# Patient Record
Sex: FEMALE | Race: WHITE | NOT HISPANIC OR LATINO | Employment: FULL TIME | URBAN - METROPOLITAN AREA
[De-identification: names, ages, dates, MRNs, and addresses within clinical notes are randomized per-mention and may not be internally consistent; named-entity substitution may affect disease eponyms.]

---

## 2018-05-24 ENCOUNTER — HISTORICAL (OUTPATIENT)
Dept: ADMINISTRATIVE | Facility: HOSPITAL | Age: 28
End: 2018-05-24

## 2018-05-24 LAB
ABS NEUT (OLG): 3.4
ALBUMIN SERPL-MCNC: 4.5 GM/DL (ref 3.4–5)
ALBUMIN/GLOB SERPL: 1.67 {RATIO} (ref 1.5–2.5)
ALP SERPL-CCNC: 68 UNIT/L (ref 38–126)
ALT SERPL-CCNC: 35 UNIT/L (ref 7–52)
APPEARANCE, UA: CLEAR
AST SERPL-CCNC: 28 UNIT/L (ref 15–37)
BACTERIA #/AREA URNS AUTO: ABNORMAL /HPF
BILIRUB SERPL-MCNC: 0.6 MG/DL (ref 0.2–1)
BILIRUB UR QL STRIP: NEGATIVE MG/DL
BILIRUBIN DIRECT+TOT PNL SERPL-MCNC: 0.2 MG/DL (ref 0–0.5)
BILIRUBIN DIRECT+TOT PNL SERPL-MCNC: 0.4 MG/DL
BUN SERPL-MCNC: 10 MG/DL (ref 7–18)
CALCIUM SERPL-MCNC: 8.6 MG/DL (ref 8.5–10)
CHLORIDE SERPL-SCNC: 104 MMOL/L (ref 98–107)
CHOLEST SERPL-MCNC: 181 MG/DL (ref 0–200)
CHOLEST/HDLC SERPL: 3.4 {RATIO}
CO2 SERPL-SCNC: 27 MMOL/L (ref 21–32)
COLOR UR: YELLOW
CREAT SERPL-MCNC: 0.65 MG/DL (ref 0.6–1.3)
ERYTHROCYTE [DISTWIDTH] IN BLOOD BY AUTOMATED COUNT: 12.5 % (ref 11.5–17)
GLOBULIN SER-MCNC: 2.7 GM/DL (ref 1.2–3)
GLUCOSE (UA): NEGATIVE MG/DL
GLUCOSE SERPL-MCNC: 96 MG/DL (ref 74–106)
HCT VFR BLD AUTO: 38.8 % (ref 37–47)
HDLC SERPL-MCNC: 54 MG/DL (ref 35–60)
HGB BLD-MCNC: 13.1 GM/DL (ref 12–16)
HGB UR QL STRIP: ABNORMAL UNIT/L
KETONES UR QL STRIP: NEGATIVE MG/DL
LDLC SERPL CALC-MCNC: 105 MG/DL (ref 0–129)
LEUKOCYTE ESTERASE UR QL STRIP: NEGATIVE UNIT/L
LYMPHOCYTES # BLD AUTO: 2.2 X10(3)/MCL (ref 0.6–3.4)
LYMPHOCYTES NFR BLD AUTO: 37.3 % (ref 13–40)
MCH RBC QN AUTO: 29.6 PG (ref 27–31.2)
MCHC RBC AUTO-ENTMCNC: 34 GM/DL (ref 32–36)
MCV RBC AUTO: 88 FL (ref 80–94)
MONOCYTES # BLD AUTO: 0.4 X10(3)/MCL (ref 0–1.8)
MONOCYTES NFR BLD AUTO: 7.4 % (ref 0.1–24)
NEUTROPHILS NFR BLD AUTO: 55.3 % (ref 47–80)
NITRITE UR QL STRIP.AUTO: NEGATIVE
PH UR STRIP: 7 [PH]
PLATELET # BLD AUTO: 254 X10(3)/MCL (ref 130–400)
PMV BLD AUTO: 8.7 FL
POTASSIUM SERPL-SCNC: 4 MMOL/L (ref 3.5–5.1)
PROT SERPL-MCNC: 7.2 GM/DL (ref 6.4–8.2)
PROT UR QL STRIP: NEGATIVE MG/DL
RBC # BLD AUTO: 4.42 X10(6)/MCL (ref 4.2–5.4)
RBC #/AREA URNS HPF: ABNORMAL /HPF
SODIUM SERPL-SCNC: 136 MMOL/L (ref 136–145)
SP GR UR STRIP: 1.01
SQUAMOUS EPITHELIAL, UA: ABNORMAL /LPF
TRIGL SERPL-MCNC: 49 MG/DL (ref 30–150)
TSH SERPL-ACNC: 1.05 MIU/ML (ref 0.35–4.94)
UROBILINOGEN UR STRIP-ACNC: 0.2 MG/DL
VLDLC SERPL CALC-MCNC: 9.8 MG/DL
WBC # SPEC AUTO: 6 X10(3)/MCL (ref 4.5–11.5)
WBC #/AREA URNS AUTO: ABNORMAL /[HPF]

## 2020-05-22 LAB — PAP RECOMMENDATION EXT: NORMAL

## 2021-10-29 LAB — POC BETA-HCG (QUAL): NEGATIVE

## 2022-04-11 ENCOUNTER — HISTORICAL (OUTPATIENT)
Dept: ADMINISTRATIVE | Facility: HOSPITAL | Age: 32
End: 2022-04-11
Payer: COMMERCIAL

## 2022-04-28 VITALS
OXYGEN SATURATION: 98 % | DIASTOLIC BLOOD PRESSURE: 86 MMHG | WEIGHT: 139.75 LBS | SYSTOLIC BLOOD PRESSURE: 122 MMHG | BODY MASS INDEX: 23.86 KG/M2 | HEIGHT: 64 IN

## 2022-06-10 ENCOUNTER — OFFICE VISIT (OUTPATIENT)
Dept: GYNECOLOGY | Facility: CLINIC | Age: 32
End: 2022-06-10
Payer: COMMERCIAL

## 2022-06-10 VITALS
OXYGEN SATURATION: 98 % | TEMPERATURE: 98 F | WEIGHT: 150 LBS | DIASTOLIC BLOOD PRESSURE: 84 MMHG | SYSTOLIC BLOOD PRESSURE: 129 MMHG | BODY MASS INDEX: 25.61 KG/M2 | HEIGHT: 64 IN | HEART RATE: 76 BPM | RESPIRATION RATE: 18 BRPM

## 2022-06-10 DIAGNOSIS — Z01.419 ROUTINE GYNECOLOGICAL EXAMINATION: Primary | ICD-10-CM

## 2022-06-10 PROCEDURE — 99214 OFFICE O/P EST MOD 30 MIN: CPT | Mod: PBBFAC | Performed by: OBSTETRICS & GYNECOLOGY

## 2022-06-10 RX ORDER — ESCITALOPRAM OXALATE 20 MG/1
20 TABLET ORAL
COMMUNITY
End: 2023-06-16

## 2022-06-10 RX ORDER — ARMODAFINIL 250 MG/1
250 TABLET ORAL EVERY MORNING
COMMUNITY
Start: 2022-05-23

## 2022-06-10 RX ORDER — NORGESTIMATE AND ETHINYL ESTRADIOL 0.25-0.035
KIT ORAL
COMMUNITY
End: 2022-06-10

## 2022-06-10 RX ORDER — SERTRALINE HYDROCHLORIDE 100 MG/1
TABLET, FILM COATED ORAL
COMMUNITY
Start: 2022-03-01 | End: 2022-06-10

## 2022-06-10 RX ORDER — ACETAMINOPHEN AND CODEINE PHOSPHATE 120; 12 MG/5ML; MG/5ML
SOLUTION ORAL
COMMUNITY
Start: 2022-02-04 | End: 2022-06-10 | Stop reason: SDUPTHER

## 2022-06-10 RX ORDER — AZELASTINE 1 MG/ML
SPRAY, METERED NASAL
COMMUNITY

## 2022-06-10 RX ORDER — GALCANEZUMAB 120 MG/ML
INJECTION, SOLUTION SUBCUTANEOUS
COMMUNITY
Start: 2021-11-11 | End: 2022-10-15

## 2022-06-10 RX ORDER — LORAZEPAM 1 MG/1
TABLET ORAL
COMMUNITY
Start: 2022-04-18 | End: 2022-06-10

## 2022-06-10 RX ORDER — ACETAMINOPHEN AND CODEINE PHOSPHATE 120; 12 MG/5ML; MG/5ML
1 SOLUTION ORAL DAILY
Qty: 28 TABLET | Refills: 11 | Status: SHIPPED | OUTPATIENT
Start: 2022-06-10 | End: 2023-06-16

## 2022-06-10 RX ORDER — PEDIATRIC MULTIVITAMIN NO.238
TABLET,CHEWABLE ORAL
COMMUNITY
Start: 2022-01-01

## 2022-06-10 RX ORDER — SERTRALINE HYDROCHLORIDE 100 MG/1
100 TABLET, FILM COATED ORAL DAILY
COMMUNITY
Start: 2022-05-01

## 2022-06-10 RX ORDER — UBROGEPANT 100 MG/1
TABLET ORAL
COMMUNITY
End: 2023-06-16

## 2022-06-10 NOTE — PROGRESS NOTES
Subjective:       Patient ID: Klarissa Adan is a 32 y.o. female.    Chief Complaint:  Well Woman    History of Present Illness:  Presents for gyn wellness visit without complaints.  Periods are regular without excessive bleeding or pain.  She has regulated to cycles with OCs for the past 6 months and will be ready to discontinue after the summer.  She denies abnormal bleeding, vaginal discharge, breast masses or other changes of concern.     GYN & OB History  Patient's last menstrual period was 2022.   Date of Last Pap: No result found    OB History    Para Term  AB Living   5 2           SAB IAB Ectopic Multiple Live Births                  # Outcome Date GA Lbr Supa/2nd Weight Sex Delivery Anes PTL Lv   5             4             3             2 Para            1 Para                Vitals:    06/10/22 1423   BP: 129/84   Pulse: 76   Resp: 18   Temp: 97.7 °F (36.5 °C)        Past Surgical History:   Procedure Laterality Date    CHOLECYSTECTOMY      SALPINGECTOMY Right     SINUS SURGERY      WRIST SURGERY Left         Current Outpatient Medications:     armodafiniL (NUVIGIL) 250 mg tablet, Take 250 mg by mouth every morning., Disp: , Rfl:     azelastine (ASTELIN) 137 mcg (0.1 %) nasal spray, , Disp: , Rfl:     EScitalopram oxalate (LEXAPRO) 20 MG tablet, Take 20 mg by mouth as needed., Disp: , Rfl:     galcanezumab-gnlm (EMGALITY SYRINGE) 120 mg/mL Syrg, 1 ml, Disp: , Rfl:     multivit with min-folic acid 200 mcg Chew, , Disp: , Rfl:     norethindrone (MICRONOR) 0.35 mg tablet, , Disp: , Rfl:     sertraline (ZOLOFT) 100 MG tablet, Take 100 mg by mouth once daily., Disp: , Rfl:     ubrogepant (UBROGEPANT) 100 mg tablet, TAKE 1 TABLET BY MOUTH . MAY TAKE 2ND DOSE AT LEAST 2 HRS AFTER FIRST DOSE AS NEEDED, Disp: , Rfl:      Review of patient's allergies indicates:  No Known Allergies     Social History     Socioeconomic History    Marital status:     Tobacco Use    Smoking status: Never Smoker    Smokeless tobacco: Never Used   Substance and Sexual Activity    Alcohol use: Not Currently    Drug use: Never    Sexual activity: Yes     Birth control/protection: OCP          There is no immunization history on file for this patient.     There are no preventive care reminders to display for this patient.     Review of Systems  Review of Systems        Objective:    Physical Exam:   Constitutional: She is oriented to person, place, and time. She appears well-developed and well-nourished.    HENT:   Head: Normocephalic.    Eyes: Pupils are equal, round, and reactive to light. EOM are normal.    Neck: No thyromegaly present.    Cardiovascular: Normal rate, regular rhythm and normal heart sounds.    No murmur heard.   Pulmonary/Chest: Effort normal and breath sounds normal. Right breast exhibits no mass, no nipple discharge, no skin change and no tenderness. Left breast exhibits no mass, no nipple discharge, no skin change and no tenderness. Breasts are symmetrical.        Abdominal: Soft. She exhibits no distension. There is no abdominal tenderness.     Genitourinary:    Vagina and uterus normal.   The external female genitalia was normal.   Labial bartholins normal.There is no lesion on the right labia. There is no lesion on the left labia. Cervix is normal. Right adnexum displays no mass and no tenderness. Left adnexum displays no mass and no tenderness. Vagina exhibits no lesion. No erythema, rectocele, cystocele or unspecified prolapse of vaginal walls in the vagina. Cervix exhibits no lesion, no discharge, no friability, no lesion and no tenderness. Uerus contour normal  Uterus is not fixed. Normal urethral meatus.Bladder findings: no bladder tenderness          Musculoskeletal: Normal range of motion. No edema.      Lymphadenopathy:     She has no cervical adenopathy.    Neurological: She is alert and oriented to person, place, and time.    Skin: Skin is  warm and dry. No rash noted.    Psychiatric: She has a normal mood and affect.          Assessment:        1. Routine gynecological examination                Plan:      Problem List Items Addressed This Visit    None     Visit Diagnoses     Routine gynecological examination    -  Primary             Follow up in 1 year (on 6/10/2023) for GYN Wellness.   SBE

## 2022-09-22 ENCOUNTER — HISTORICAL (OUTPATIENT)
Dept: ADMINISTRATIVE | Facility: HOSPITAL | Age: 32
End: 2022-09-22
Payer: COMMERCIAL

## 2022-10-17 ENCOUNTER — DOCUMENTATION ONLY (OUTPATIENT)
Dept: FAMILY MEDICINE | Facility: CLINIC | Age: 32
End: 2022-10-17
Payer: COMMERCIAL

## 2023-06-16 ENCOUNTER — OFFICE VISIT (OUTPATIENT)
Dept: GYNECOLOGY | Facility: CLINIC | Age: 33
End: 2023-06-16
Payer: COMMERCIAL

## 2023-06-16 VITALS
HEART RATE: 105 BPM | BODY MASS INDEX: 25.78 KG/M2 | HEIGHT: 64 IN | RESPIRATION RATE: 20 BRPM | TEMPERATURE: 98 F | SYSTOLIC BLOOD PRESSURE: 116 MMHG | WEIGHT: 151 LBS | OXYGEN SATURATION: 100 % | DIASTOLIC BLOOD PRESSURE: 80 MMHG

## 2023-06-16 DIAGNOSIS — Z01.419 ROUTINE GYNECOLOGICAL EXAMINATION: Primary | ICD-10-CM

## 2023-06-16 PROCEDURE — 3008F BODY MASS INDEX DOCD: CPT | Mod: CPTII,,, | Performed by: OBSTETRICS & GYNECOLOGY

## 2023-06-16 PROCEDURE — 3074F SYST BP LT 130 MM HG: CPT | Mod: CPTII,,, | Performed by: OBSTETRICS & GYNECOLOGY

## 2023-06-16 PROCEDURE — 3079F DIAST BP 80-89 MM HG: CPT | Mod: CPTII,,, | Performed by: OBSTETRICS & GYNECOLOGY

## 2023-06-16 PROCEDURE — 99214 OFFICE O/P EST MOD 30 MIN: CPT | Mod: PBBFAC | Performed by: OBSTETRICS & GYNECOLOGY

## 2023-06-16 PROCEDURE — 3079F PR MOST RECENT DIASTOLIC BLOOD PRESSURE 80-89 MM HG: ICD-10-PCS | Mod: CPTII,,, | Performed by: OBSTETRICS & GYNECOLOGY

## 2023-06-16 PROCEDURE — 1159F MED LIST DOCD IN RCRD: CPT | Mod: CPTII,,, | Performed by: OBSTETRICS & GYNECOLOGY

## 2023-06-16 PROCEDURE — 1159F PR MEDICATION LIST DOCUMENTED IN MEDICAL RECORD: ICD-10-PCS | Mod: CPTII,,, | Performed by: OBSTETRICS & GYNECOLOGY

## 2023-06-16 PROCEDURE — 1160F RVW MEDS BY RX/DR IN RCRD: CPT | Mod: CPTII,,, | Performed by: OBSTETRICS & GYNECOLOGY

## 2023-06-16 PROCEDURE — 1160F PR REVIEW ALL MEDS BY PRESCRIBER/CLIN PHARMACIST DOCUMENTED: ICD-10-PCS | Mod: CPTII,,, | Performed by: OBSTETRICS & GYNECOLOGY

## 2023-06-16 PROCEDURE — 87624 HPV HI-RISK TYP POOLED RSLT: CPT

## 2023-06-16 PROCEDURE — 99395 PREV VISIT EST AGE 18-39: CPT | Mod: S$PBB,,, | Performed by: OBSTETRICS & GYNECOLOGY

## 2023-06-16 PROCEDURE — 99395 PR PREVENTIVE VISIT,EST,18-39: ICD-10-PCS | Mod: S$PBB,,, | Performed by: OBSTETRICS & GYNECOLOGY

## 2023-06-16 PROCEDURE — 3008F PR BODY MASS INDEX (BMI) DOCUMENTED: ICD-10-PCS | Mod: CPTII,,, | Performed by: OBSTETRICS & GYNECOLOGY

## 2023-06-16 PROCEDURE — 3074F PR MOST RECENT SYSTOLIC BLOOD PRESSURE < 130 MM HG: ICD-10-PCS | Mod: CPTII,,, | Performed by: OBSTETRICS & GYNECOLOGY

## 2023-06-16 RX ORDER — OMEPRAZOLE 40 MG/1
40 CAPSULE, DELAYED RELEASE ORAL
COMMUNITY
Start: 2023-02-01

## 2023-06-16 RX ORDER — MUPIROCIN 20 MG/G
OINTMENT TOPICAL
COMMUNITY
Start: 2022-12-19

## 2023-06-16 RX ORDER — UBROGEPANT 100 MG/1
TABLET ORAL
COMMUNITY

## 2023-06-16 RX ORDER — DIPHENHYDRAMINE HCL 25 MG/1
2 TABLET, CHEWABLE ORAL DAILY PRN
COMMUNITY
Start: 2023-04-06

## 2023-06-16 RX ORDER — FERROUS SULFATE 325(65) MG
TABLET ORAL
COMMUNITY
Start: 2023-02-27

## 2023-06-16 RX ORDER — LORAZEPAM 1 MG/1
1 TABLET ORAL
COMMUNITY
Start: 2023-04-26

## 2023-06-16 RX ORDER — GALCANEZUMAB 120 MG/ML
INJECTION, SOLUTION SUBCUTANEOUS
COMMUNITY

## 2023-06-16 RX ORDER — FEXOFENADINE HYDROCHLORIDE 180 MG/1
180 TABLET, FILM COATED ORAL DAILY PRN
COMMUNITY
Start: 2023-04-06

## 2023-06-16 NOTE — PROGRESS NOTES
Subjective:       Patient ID: Klarissa Adan is a 33 y.o. female.    Chief Complaint:  annual     History of Present Illness:  Presents for gyn wellness visit without complaints, but she had a return to coital bleeding last month after one year without.  She had cervical ectropion cauterized with silver nitrate last year and desires to retreat.  Periods are q 21-28 X 7-8 without excessive bleeding or pain. She denies abnormal bleeding, vaginal discharge, breast masses or other changes of concern.     GYN & OB History  Patient's last menstrual period was 2023 (exact date).   Date of Last Pap: 2020    OB History    Para Term  AB Living   5 1     3     SAB IAB Ectopic Multiple Live Births   2   1          # Outcome Date GA Lbr Supa/2nd Weight Sex Delivery Anes PTL Lv   5       Vag-Spont      4 SAB            3 SAB            2 Para      Vag-Spont      1 Ectopic                Past Medical History:   Diagnosis Date    Abnormal Pap smear of cervix     Breast disorder     Hypertension       Past Surgical History:   Procedure Laterality Date    CHOLECYSTECTOMY      SALPINGECTOMY Right     SINUS SURGERY      WRIST SURGERY Left         Current Outpatient Medications:     ALLERGY RELIEF, FEXOFENADINE, 180 mg tablet, Take 180 mg by mouth daily as needed., Disp: , Rfl:     armodafiniL (NUVIGIL) 250 mg tablet, Take 250 mg by mouth every morning., Disp: , Rfl:     azelastine (ASTELIN) 137 mcg (0.1 %) nasal spray, , Disp: , Rfl:     ferrous sulfate (FEOSOL) 325 mg (65 mg iron) Tab tablet, Take by mouth., Disp: , Rfl:     galcanezumab-gnlm (EMGALITY SYRINGE) 120 mg/mL Syrg, inject 1 milliliter subcutaneously every 28 days, Disp: , Rfl:     LORazepam (ATIVAN) 1 MG tablet, 1 tablet as needed., Disp: , Rfl:     multivit with min-folic acid 200 mcg Chew, , Disp: , Rfl:     mupirocin (BACTROBAN) 2 % ointment, Apply topically., Disp: , Rfl:     NASAL ALLERGY 55 mcg nasal inhaler, 2 sprays  daily as needed., Disp: , Rfl:     omeprazole (PRILOSEC) 40 MG capsule, Take 40 mg by mouth., Disp: , Rfl:     sertraline (ZOLOFT) 100 MG tablet, Take 100 mg by mouth once daily., Disp: , Rfl:     ubrogepant (UBRELVY) 100 mg tablet, take 1 tablet by mouth . may take 2nd dose at least 2 hrs after first dose as needed, Disp: , Rfl:      Review of patient's allergies indicates:  No Known Allergies     Social History     Socioeconomic History    Marital status:    Tobacco Use    Smoking status: Never    Smokeless tobacco: Never   Substance and Sexual Activity    Alcohol use: Not Currently    Drug use: Never    Sexual activity: Yes          There is no immunization history on file for this patient.     There are no preventive care reminders to display for this patient.     Review of Systems  Review of Systems       Objective:     Vitals:    06/16/23 1115   BP: 116/80   Pulse: 105   Resp: 20   Temp: 98.2 °F (36.8 °C)       Physical Exam:   Constitutional: She is oriented to person, place, and time. She appears well-developed and well-nourished.    HENT:   Head: Normocephalic.    Eyes: Pupils are equal, round, and reactive to light. EOM are normal.    Neck: No thyromegaly present.    Cardiovascular:  Normal rate, regular rhythm and normal heart sounds.            No murmur heard.   Pulmonary/Chest: Effort normal and breath sounds normal. Right breast exhibits no mass, no nipple discharge, no skin change and no tenderness. Left breast exhibits no mass, no nipple discharge, no skin change and no tenderness. Breasts are symmetrical.        Abdominal: Soft. She exhibits no distension. There is no abdominal tenderness.     Genitourinary:    Vagina and uterus normal.   The external female genitalia was normal.   Labial bartholins normal.There is no lesion on the right labia. There is no lesion on the left labia. Cervix is normal. Right adnexum displays no mass and no tenderness. Left adnexum displays no mass and  no tenderness. Vagina exhibits no lesion. No erythema, rectocele, cystocele or unspecified prolapse of vaginal walls in the vagina.       Cervix exhibits no lesion, no discharge, no friability, no lesion and no tenderness. Uerus contour normal  Uterus is not fixed. Normal urethral meatus.Bladder findings: no bladder tenderness   Genitourinary Comments: Ectropion cauterized with silver nitrate. Patient tolerated well.             Musculoskeletal: Normal range of motion. No edema.      Lymphadenopathy:     She has no cervical adenopathy.    Neurological: She is alert and oriented to person, place, and time.    Skin: Skin is warm and dry. No rash noted.    Psychiatric: She has a normal mood and affect.        Assessment:        1. Routine gynecological examination                Plan:      Problem List Items Addressed This Visit    None  Visit Diagnoses       Routine gynecological examination    -  Primary    Relevant Orders    Liquid-Based Pap Smear, Screening Screening               Follow up in 1 year (on 6/16/2024).   SBE

## 2023-06-20 LAB — PSYCHE PATHOLOGY RESULT: NORMAL

## 2023-11-30 ENCOUNTER — OFFICE VISIT (OUTPATIENT)
Dept: GYNECOLOGY | Facility: CLINIC | Age: 33
End: 2023-11-30
Payer: COMMERCIAL

## 2023-11-30 VITALS
TEMPERATURE: 98 F | OXYGEN SATURATION: 100 % | SYSTOLIC BLOOD PRESSURE: 118 MMHG | HEART RATE: 85 BPM | WEIGHT: 164.38 LBS | BODY MASS INDEX: 28.06 KG/M2 | HEIGHT: 64 IN | DIASTOLIC BLOOD PRESSURE: 76 MMHG

## 2023-11-30 DIAGNOSIS — N76.4 LABIAL ABSCESS: Primary | ICD-10-CM

## 2023-11-30 PROCEDURE — 1160F PR REVIEW ALL MEDS BY PRESCRIBER/CLIN PHARMACIST DOCUMENTED: ICD-10-PCS | Mod: CPTII,,, | Performed by: OBSTETRICS & GYNECOLOGY

## 2023-11-30 PROCEDURE — 3008F PR BODY MASS INDEX (BMI) DOCUMENTED: ICD-10-PCS | Mod: CPTII,,, | Performed by: OBSTETRICS & GYNECOLOGY

## 2023-11-30 PROCEDURE — 3078F DIAST BP <80 MM HG: CPT | Mod: CPTII,,, | Performed by: OBSTETRICS & GYNECOLOGY

## 2023-11-30 PROCEDURE — 3074F PR MOST RECENT SYSTOLIC BLOOD PRESSURE < 130 MM HG: ICD-10-PCS | Mod: CPTII,,, | Performed by: OBSTETRICS & GYNECOLOGY

## 2023-11-30 PROCEDURE — 3008F BODY MASS INDEX DOCD: CPT | Mod: CPTII,,, | Performed by: OBSTETRICS & GYNECOLOGY

## 2023-11-30 PROCEDURE — 1159F PR MEDICATION LIST DOCUMENTED IN MEDICAL RECORD: ICD-10-PCS | Mod: CPTII,,, | Performed by: OBSTETRICS & GYNECOLOGY

## 2023-11-30 PROCEDURE — 99214 OFFICE O/P EST MOD 30 MIN: CPT | Mod: PBBFAC | Performed by: OBSTETRICS & GYNECOLOGY

## 2023-11-30 PROCEDURE — 99213 OFFICE O/P EST LOW 20 MIN: CPT | Mod: S$PBB,,, | Performed by: OBSTETRICS & GYNECOLOGY

## 2023-11-30 PROCEDURE — 99213 PR OFFICE/OUTPT VISIT, EST, LEVL III, 20-29 MIN: ICD-10-PCS | Mod: S$PBB,,, | Performed by: OBSTETRICS & GYNECOLOGY

## 2023-11-30 PROCEDURE — 3074F SYST BP LT 130 MM HG: CPT | Mod: CPTII,,, | Performed by: OBSTETRICS & GYNECOLOGY

## 2023-11-30 PROCEDURE — 3078F PR MOST RECENT DIASTOLIC BLOOD PRESSURE < 80 MM HG: ICD-10-PCS | Mod: CPTII,,, | Performed by: OBSTETRICS & GYNECOLOGY

## 2023-11-30 PROCEDURE — 1159F MED LIST DOCD IN RCRD: CPT | Mod: CPTII,,, | Performed by: OBSTETRICS & GYNECOLOGY

## 2023-11-30 PROCEDURE — 1160F RVW MEDS BY RX/DR IN RCRD: CPT | Mod: CPTII,,, | Performed by: OBSTETRICS & GYNECOLOGY

## 2023-11-30 RX ORDER — SOLRIAMFETOL 150 MG/1
1 TABLET, FILM COATED ORAL
COMMUNITY
Start: 2023-10-16

## 2023-11-30 RX ORDER — DICLOFENAC SODIUM 10 MG/G
GEL TOPICAL
COMMUNITY
Start: 2023-11-13

## 2023-11-30 RX ORDER — CLINDAMYCIN HYDROCHLORIDE 300 MG/1
300 CAPSULE ORAL EVERY 8 HOURS
Qty: 15 CAPSULE | Refills: 0 | Status: SHIPPED | OUTPATIENT
Start: 2023-11-30 | End: 2023-12-05

## 2023-11-30 RX ORDER — CYCLOSPORINE 0.5 MG/ML
1 EMULSION OPHTHALMIC 2 TIMES DAILY
COMMUNITY
Start: 2023-11-22

## 2023-11-30 RX ORDER — MUPIROCIN 20 MG/G
OINTMENT TOPICAL 3 TIMES DAILY
Qty: 30 G | Refills: 0 | Status: SHIPPED | OUTPATIENT
Start: 2023-11-30

## 2023-11-30 NOTE — PROGRESS NOTES
Subjective     Patient ID: Klarissa Adan is a 33 y.o. female.    Chief Complaint: Annual Exam    Reports onset of right labial swelling and pain after shaving.  She expressed purulent material last weekend and has noted improvement over the last 3 days.  Denies fever.   No past history of similar symptoms or diabetes.    Review of Systems       Objective     Physical Exam  Genitourinary:     Comments: Right labia with mild erythema and edema.  Drainage site noted at superior medial aspect.           Assessment and Plan     1. Labial abscess    Other orders  -     clindamycin (CLEOCIN) 300 MG capsule; Take 1 capsule (300 mg total) by mouth every 8 (eight) hours. for 5 days  Dispense: 15 capsule; Refill: 0  -     mupirocin (BACTROBAN) 2 % ointment; Apply topically 3 (three) times daily.  Dispense: 30 g; Refill: 0        Return for swelling redness or pain.         F/U for annual or PRN

## 2024-07-26 ENCOUNTER — OFFICE VISIT (OUTPATIENT)
Dept: GYNECOLOGY | Facility: CLINIC | Age: 34
End: 2024-07-26
Payer: COMMERCIAL

## 2024-07-26 VITALS
HEART RATE: 79 BPM | WEIGHT: 161 LBS | TEMPERATURE: 98 F | OXYGEN SATURATION: 99 % | HEIGHT: 64 IN | RESPIRATION RATE: 18 BRPM | DIASTOLIC BLOOD PRESSURE: 81 MMHG | BODY MASS INDEX: 27.49 KG/M2 | SYSTOLIC BLOOD PRESSURE: 118 MMHG

## 2024-07-26 DIAGNOSIS — Z01.419 ROUTINE GYNECOLOGICAL EXAMINATION: Primary | ICD-10-CM

## 2024-07-26 PROCEDURE — 99215 OFFICE O/P EST HI 40 MIN: CPT | Mod: PBBFAC | Performed by: OBSTETRICS & GYNECOLOGY

## 2024-07-26 RX ORDER — PANTOPRAZOLE SODIUM 40 MG/1
30 TABLET, DELAYED RELEASE ORAL
COMMUNITY
Start: 2024-07-16

## 2024-07-26 RX ORDER — SILVER NITRATE 38.21; 12.74 MG/1; MG/1
1 STICK TOPICAL
Status: COMPLETED | OUTPATIENT
Start: 2024-07-26 | End: 2024-07-26

## 2024-07-26 RX ORDER — CALCIUM CARBONATE 200(500)MG
TABLET,CHEWABLE ORAL
COMMUNITY

## 2024-07-26 RX ORDER — GALCANEZUMAB 120 MG/ML
INJECTION, SOLUTION SUBCUTANEOUS
COMMUNITY

## 2024-07-26 RX ORDER — FEXOFENADINE HCL 60 MG
TABLET ORAL
COMMUNITY

## 2024-07-26 RX ADMIN — SILVER NITRATE 1 APPLICATOR: 38.21; 12.74 STICK TOPICAL at 11:07

## 2024-07-26 NOTE — PROGRESS NOTES
Subjective:       Patient ID: Klarissa Adan is a 34 y.o. female.    Chief Complaint:  Well Woman    History of Present Illness:  Presents for gyn wellness visit with complaints of return of postcoital bleeding over the last 2 months.  She had a negative GC/CT in  and has been in a monogamous,  relationship.  We performed silver nitrate cautery of her prominent ectropion in  and again in  . It controlled the bleeding until May.  The bleeding is significant for at least a day when it occurs.  Periods are otherwise regular, Q 30 days, without excessive bleeding or pain. She denies intermenstrual bleeding, vaginal discharge, breast masses or other changes of concern.     GYN & OB History  Patient's last menstrual period was 2024.   Date of Last Pap: 2023 - NIL, HPV negative    OB History    Para Term  AB Living   5 1     3     SAB IAB Ectopic Multiple Live Births   2   1          # Outcome Date GA Lbr Supa/2nd Weight Sex Type Anes PTL Lv   5       Vag-Spont      4 SAB            3 SAB            2 Para      Vag-Spont      1 Ectopic                Past Medical History:   Diagnosis Date    Abnormal Pap smear of cervix     Breast disorder     Hypertension       Past Surgical History:   Procedure Laterality Date    CHOLECYSTECTOMY      SALPINGECTOMY Right     SINUS SURGERY      WRIST SURGERY Left         Current Outpatient Medications:     ALLERGY RELIEF, FEXOFENADINE, 180 mg tablet, Take 180 mg by mouth daily as needed., Disp: , Rfl:     armodafiniL (NUVIGIL) 250 mg tablet, Take 250 mg by mouth every morning., Disp: , Rfl:     azelastine (ASTELIN) 137 mcg (0.1 %) nasal spray, , Disp: , Rfl:     calcium carbonate (TUMS) 200 mg calcium (500 mg) chewable tablet, Take 3 tablets by mouth three times a day as needed, Disp: , Rfl:     diclofenac sodium (VOLTAREN) 1 % Gel, 2 grams Externally Twice a day, Disp: , Rfl:     ferrous sulfate (FEOSOL) 325 mg (65 mg iron) Tab tablet,  Take by mouth., Disp: , Rfl:     fexofenadine (ALLEGRA ALLERGY) 60 MG tablet, 0, Disp: , Rfl:     galcanezumab-gnlm (EMGALITY SYRINGE) 120 mg/mL Syrg, inject 1 milliliter subcutaneously every 28 days, Disp: , Rfl:     LORazepam (ATIVAN) 1 MG tablet, 1 tablet as needed., Disp: , Rfl:     multivit with min-folic acid 200 mcg Chew, , Disp: , Rfl:     mupirocin (BACTROBAN) 2 % ointment, Apply topically 3 (three) times daily., Disp: 30 g, Rfl: 0    NASAL ALLERGY 55 mcg nasal inhaler, 2 sprays daily as needed., Disp: , Rfl:     omeprazole (PRILOSEC) 40 MG capsule, Take 40 mg by mouth., Disp: , Rfl:     pantoprazole (PROTONIX) 40 MG tablet, 30 tablets., Disp: , Rfl:     RESTASIS 0.05 % ophthalmic emulsion, Place 1 drop into both eyes 2 (two) times daily., Disp: , Rfl:     sertraline (ZOLOFT) 100 MG tablet, Take 100 mg by mouth once daily., Disp: , Rfl:     SUNOSI 150 mg Tab, Take 1 tablet by mouth., Disp: , Rfl:     galcanezumab-gnlm (EMGALITY SYRINGE) 120 mg/mL Syrg, 1 (Patient not taking: Reported on 7/26/2024), Disp: , Rfl:     mupirocin (BACTROBAN) 2 % ointment, Apply topically. (Patient not taking: Reported on 7/26/2024), Disp: , Rfl:     ubrogepant (UBRELVY) 100 mg tablet, take 1 tablet by mouth . may take 2nd dose at least 2 hrs after first dose as needed (Patient not taking: Reported on 7/26/2024), Disp: , Rfl:   No current facility-administered medications for this visit.     Review of patient's allergies indicates:  No Known Allergies     Social History     Socioeconomic History    Marital status:    Tobacco Use    Smoking status: Never    Smokeless tobacco: Never   Substance and Sexual Activity    Alcohol use: Not Currently    Drug use: Never    Sexual activity: Yes     Partners: Male        Immunization History   Administered Date(s) Administered    Influenza - Quadrivalent - PF *Preferred* (6 months and older) 10/31/2018    Tdap 02/28/2019        Health Maintenance Due   Topic Date Due    COVID-19  Vaccine (1 - 2023-24 season) Never done        Review of Systems  Review of Systems       Objective:     Vitals:    07/26/24 1116   BP: 118/81   Pulse: 79   Resp: 18   Temp: 98.2 °F (36.8 °C)       Physical Exam:   Constitutional: She is oriented to person, place, and time. She appears well-developed and well-nourished.    HENT:   Head: Normocephalic.    Eyes: Pupils are equal, round, and reactive to light. EOM are normal.    Neck: No thyromegaly present.    Cardiovascular:  Normal rate, regular rhythm and normal heart sounds.            No murmur heard.   Pulmonary/Chest: Effort normal and breath sounds normal. Right breast exhibits no mass, no nipple discharge, no skin change and no tenderness. Left breast exhibits no mass, no nipple discharge, no skin change and no tenderness. Breasts are symmetrical.        Abdominal: Soft. She exhibits no distension. There is no abdominal tenderness.     Genitourinary:    Vagina and uterus normal.   The external female genitalia was normal.     Labial bartholins normal.There is no lesion on the right labia. There is no lesion on the left labia. Cervix is normal. Right adnexum displays no mass and no tenderness. Left adnexum displays no mass and no tenderness. Vagina exhibits no lesion. No erythema, rectocele, cystocele or prolapse of vaginal walls in the vagina. Cervix exhibits no lesion, no discharge, no friability and no tenderness. Uerus contour normal  Uterus is not fixed. Normal urethral meatus.Bladder findings: no bladder tenderness   Genitourinary Comments: Endocervical tissue visible but nonfriable. Silver nitrate cautery performed. Patient tolerated well.             Musculoskeletal: Normal range of motion. No edema.      Lymphadenopathy:     She has no cervical adenopathy.    Neurological: She is alert and oriented to person, place, and time.    Skin: Skin is warm and dry. No rash noted.    Psychiatric: She has a normal mood and affect.          Assessment:        1.  Routine gynecological examination                Plan:      Problem List Items Addressed This Visit    None  Visit Diagnoses       Routine gynecological examination    -  Primary    Relevant Medications    silver nitrate applicators applicator 1 applicator (Completed)           Medications Ordered This Encounter   Medications    silver nitrate applicators applicator 1 applicator      Follow up in about 1 year (around 7/26/2025) for GYN Wellness.   SBE     Discussed electrocautery of endocervix under local in the clinic vs OR vs hysterectomy for definitive treatment.  She will discuss with her  and assess her response to silver nitrate cautery. We also can intermittently perform silver nitrate if acceptable.